# Patient Record
Sex: MALE | Race: WHITE | NOT HISPANIC OR LATINO | Employment: FULL TIME | URBAN - METROPOLITAN AREA
[De-identification: names, ages, dates, MRNs, and addresses within clinical notes are randomized per-mention and may not be internally consistent; named-entity substitution may affect disease eponyms.]

---

## 2018-04-08 ENCOUNTER — OFFICE VISIT (OUTPATIENT)
Dept: URGENT CARE | Facility: PHYSICIAN GROUP | Age: 43
End: 2018-04-08
Payer: COMMERCIAL

## 2018-04-08 VITALS
DIASTOLIC BLOOD PRESSURE: 62 MMHG | RESPIRATION RATE: 18 BRPM | OXYGEN SATURATION: 96 % | TEMPERATURE: 98.3 F | HEART RATE: 84 BPM | SYSTOLIC BLOOD PRESSURE: 108 MMHG | WEIGHT: 280 LBS | BODY MASS INDEX: 36.94 KG/M2

## 2018-04-08 DIAGNOSIS — J01.40 ACUTE PANSINUSITIS, RECURRENCE NOT SPECIFIED: ICD-10-CM

## 2018-04-08 DIAGNOSIS — R05.9 COUGH: ICD-10-CM

## 2018-04-08 PROCEDURE — 99203 OFFICE O/P NEW LOW 30 MIN: CPT | Performed by: NURSE PRACTITIONER

## 2018-04-08 RX ORDER — DOXYCYCLINE HYCLATE 100 MG
100 TABLET ORAL 2 TIMES DAILY
Qty: 20 TAB | Refills: 0 | Status: SHIPPED | OUTPATIENT
Start: 2018-04-08

## 2018-04-08 ASSESSMENT — ENCOUNTER SYMPTOMS
FEVER: 1
SINUS PAIN: 1
CARDIOVASCULAR NEGATIVE: 1
MUSCULOSKELETAL NEGATIVE: 1
ABDOMINAL PAIN: 0
SPUTUM PRODUCTION: 0
VOMITING: 0
SHORTNESS OF BREATH: 0
DIZZINESS: 0
NAUSEA: 1
WHEEZING: 0
EYES NEGATIVE: 1
HEADACHES: 1
COUGH: 1

## 2018-04-08 NOTE — PATIENT INSTRUCTIONS
Cough, Adult  Introduction  A cough helps to clear your throat and lungs. A cough may last only 2-3 weeks (acute), or it may last longer than 8 weeks (chronic). Many different things can cause a cough. A cough may be a sign of an illness or another medical condition.  Follow these instructions at home:  · Pay attention to any changes in your cough.  · Take medicines only as told by your doctor.  ¨ If you were prescribed an antibiotic medicine, take it as told by your doctor. Do not stop taking it even if you start to feel better.  ¨ Talk with your doctor before you try using a cough medicine.  · Drink enough fluid to keep your pee (urine) clear or pale yellow.  · If the air is dry, use a cold steam vaporizer or humidifier in your home.  · Stay away from things that make you cough at work or at home.  · If your cough is worse at night, try using extra pillows to raise your head up higher while you sleep.  · Do not smoke, and try not to be around smoke. If you need help quitting, ask your doctor.  · Do not have caffeine.  · Do not drink alcohol.  · Rest as needed.  Contact a doctor if:  · You have new problems (symptoms).  · You cough up yellow fluid (pus).  · Your cough does not get better after 2-3 weeks, or your cough gets worse.  · Medicine does not help your cough and you are not sleeping well.  · You have pain that gets worse or pain that is not helped with medicine.  · You have a fever.  · You are losing weight and you do not know why.  · You have night sweats.  Get help right away if:  · You cough up blood.  · You have trouble breathing.  · Your heartbeat is very fast.  This information is not intended to replace advice given to you by your health care provider. Make sure you discuss any questions you have with your health care provider.  Document Released: 08/30/2012 Document Revised: 05/25/2017 Document Reviewed: 02/24/2016  © 2017 [employer]  Sinusitis, Adult  Sinusitis is soreness and inflammation of your  sinuses. Sinuses are hollow spaces in the bones around your face. They are located:  · Around your eyes.  · In the middle of your forehead.  · Behind your nose.  · In your cheekbones.  Your sinuses and nasal passages are lined with a stringy fluid (mucus). Mucus normally drains out of your sinuses. When your nasal tissues get inflamed or swollen, the mucus can get trapped or blocked so air cannot flow through your sinuses. This lets bacteria, viruses, and funguses grow, and that leads to infection.  Follow these instructions at home:  Medicines  · Take, use, or apply over-the-counter and prescription medicines only as told by your doctor. These may include nasal sprays.  · If you were prescribed an antibiotic medicine, take it as told by your doctor. Do not stop taking the antibiotic even if you start to feel better.  Hydrate and Humidify  · Drink enough water to keep your pee (urine) clear or pale yellow.  · Use a cool mist humidifier to keep the humidity level in your home above 50%.  · Breathe in steam for 10-15 minutes, 3-4 times a day or as told by your doctor. You can do this in the bathroom while a hot shower is running.  · Try not to spend time in cool or dry air.  Rest  · Rest as much as possible.  · Sleep with your head raised (elevated).  · Make sure to get enough sleep each night.  General instructions  · Put a warm, moist washcloth on your face 3-4 times a day or as told by your doctor. This will help with discomfort.  · Wash your hands often with soap and water. If there is no soap and water, use hand .  · Do not smoke. Avoid being around people who are smoking (secondhand smoke).  · Keep all follow-up visits as told by your doctor. This is important.  Contact a doctor if:  · You have a fever.  · Your symptoms get worse.  · Your symptoms do not get better within 10 days.  Get help right away if:  · You have a very bad headache.  · You cannot stop throwing up (vomiting).  · You have pain or  swelling around your face or eyes.  · You have trouble seeing.  · You feel confused.  · Your neck is stiff.  · You have trouble breathing.  This information is not intended to replace advice given to you by your health care provider. Make sure you discuss any questions you have with your health care provider.  Document Released: 06/05/2009 Document Revised: 08/13/2017 Document Reviewed: 10/12/2016  [employer] Interactive Patient Education © 2017 [employer]

## 2018-04-08 NOTE — PROGRESS NOTES
Subjective:      Sina Huynh is a 42 y.o. male who presents with Cough (cough, vomiting, chills, headaches, sinus pressure x4 days)            HPI   Pt c/o cough, and sinus pressure that started over weekend  Sunday tickle in throat   Monday developed a cough deep and sinus congestion  + emesis x 2 days with coughing--> resolved  Feels nauseated, but emesis has resolved. Patient has just been drinking diluted Gatorade.  No abdominal pain  Decrease  po intake but tolerating fluids  + ha  + sinus congestion--> green and yellow discharge   + low grade temps  + sick contacts  Has been staying home last few days     Med: advil and sinus rinse mild help used afrin a few times with relief     PMH:  has a past medical history of GERD (gastroesophageal reflux disease) and Hypertension.  MEDS:   Current Outpatient Prescriptions:   •  doxycycline (VIBRAMYCIN) 100 MG Tab, Take 1 Tab by mouth 2 times a day., Disp: 20 Tab, Rfl: 0  •  amlodipine (NORVASC) 10 MG TABS, Take 1 Tab by mouth every day., Disp: 30 Tab, Rfl: 11  •  potassium chloride SA (K-DUR) 10 MEQ TBCR, Take 10 mEq by mouth 2 times a day., Disp: , Rfl:   •  lisinopril (PRINIVIL, ZESTRIL) 40 MG tablet, Take 40 mg by mouth every day., Disp: , Rfl:   •  Esomeprazole Magnesium (NEXIUM PO), Take 1 Tab by mouth., Disp: , Rfl:   •  hydrochlorothiazide (HYDRODIURIL) 12.5 MG tablet, Take 12.5 mg by mouth every day., Disp: , Rfl:   ALLERGIES: No Known Allergies  SURGHX:   Past Surgical History:   Procedure Laterality Date   • PB REMOVAL OF TONSILS,<13 Y/O  infant     SOCHX:  reports that he has been smoking Cigarettes and Cigars.  His smokeless tobacco use includes Chew. He reports that he drinks alcohol. He reports that he does not use drugs.  FH: family history is not on file.    Review of Systems   Constitutional: Positive for fever.   HENT: Positive for congestion and sinus pain.         Ear fullness no pain   Eyes: Negative.    Respiratory: Positive for cough.  Negative for sputum production, shortness of breath and wheezing.    Cardiovascular: Negative.    Gastrointestinal: Positive for nausea. Negative for abdominal pain and vomiting.   Musculoskeletal: Negative.    Skin: Negative.    Neurological: Positive for headaches. Negative for dizziness.          Objective:     /62   Pulse 84   Temp 36.8 °C (98.3 °F)   Resp 18   Wt (!) 127 kg (280 lb)   SpO2 96%   BMI 36.94 kg/m²      Physical Exam   Constitutional: He is oriented to person, place, and time. He appears well-developed and well-nourished.   HENT:   Head: Normocephalic and atraumatic.   Right Ear: Hearing, external ear and ear canal normal. A middle ear effusion is present.   Left Ear: Hearing, external ear and ear canal normal. A middle ear effusion is present.   Nose: Mucosal edema present. Right sinus exhibits maxillary sinus tenderness and frontal sinus tenderness. Left sinus exhibits maxillary sinus tenderness and frontal sinus tenderness.   Eyes: Conjunctivae are normal.   Neck: Normal range of motion. Neck supple.   Cardiovascular: Normal rate, regular rhythm and normal heart sounds.    Abdominal: Soft.   Musculoskeletal: Normal range of motion.   Lymphadenopathy:     He has no cervical adenopathy.   Neurological: He is alert and oriented to person, place, and time.   Skin: Skin is warm and dry. Capillary refill takes less than 2 seconds.   Psychiatric: He has a normal mood and affect. His behavior is normal. Judgment and thought content normal.               Assessment/Plan:       1. Acute pansinusitis, recurrence not specified  doxycycline (VIBRAMYCIN) 100 MG Tab   2. Cough       Handout given to patient regarding sinusitis and cough  Educated on the importance of staying hydrated  Prescription for doxycycline with medication   Medication education done  Can alternate Tylenol and Motrin when necessary fevers or pain  E small meals drink small quantities of fluid at a time to prevent nausea and  possible emesis from coughing    Sinusitis:  Start antibiotics  Continue saline rinse  Add steroid nasal spray such as Flonase      Cough:  Discussed importance of staying hydrated  Over-the-counter cough medicines such as Robitussin cough and congestion  Can also try honey    Follow-up for any worsening symptoms.

## 2020-11-22 ENCOUNTER — TELEMEDICINE (OUTPATIENT)
Dept: TELEHEALTH | Facility: TELEMEDICINE | Age: 45
End: 2020-11-22
Payer: COMMERCIAL

## 2020-11-22 VITALS
WEIGHT: 295 LBS | HEIGHT: 73 IN | SYSTOLIC BLOOD PRESSURE: 117 MMHG | DIASTOLIC BLOOD PRESSURE: 77 MMHG | HEART RATE: 82 BPM | OXYGEN SATURATION: 90 % | BODY MASS INDEX: 39.1 KG/M2

## 2020-11-22 DIAGNOSIS — R50.9 SUBJECTIVE FEVER: ICD-10-CM

## 2020-11-22 DIAGNOSIS — R53.83 MALAISE AND FATIGUE: ICD-10-CM

## 2020-11-22 DIAGNOSIS — R53.81 MALAISE AND FATIGUE: ICD-10-CM

## 2020-11-22 PROCEDURE — 99214 OFFICE O/P EST MOD 30 MIN: CPT | Mod: 95,CR | Performed by: PHYSICIAN ASSISTANT

## 2020-11-22 NOTE — PROGRESS NOTES
"Virtual Visit: Established Patient   This visit was conducted via Zoom using secure and encrypted videoconferencing technology. The patient was in a private location in the state of [place]    The patient's identity was confirmed and verbal consent was obtained for this virtual visit.    Subjective:   CC: No chief complaint on file.      Sina Huynh is a 45 y.o. male presenting for evaluation and management of:    This is a new problem. Pt states that he developed a \"tickle in the back of his throat\" on Friday the 13th. Subsequently developed cough and runny nose. States sx mostly resolved and improved, aside from some lingering congestion. Saw PCP on Tuesday and was restarted on BP medications, but no concerns voiced about cold symptoms.  Developed fatigue and low grade fevers again this past Friday/ Saturday. Also had migraine. Migraine consistent with prior migraines. This morning he felt \"foggy\" and strange. No cough or SOB. No CP.  Pt's wife took sp02 this morning with home sensor and it was 87. Tested sensor on wife and kids who registered in the high 90s.  BP at home was 117/77. Took tylenol for migraine earlier- helped a bit.         ROS   Denies nausea or vomiting. No chest pains or shortness of breath.     No Known Allergies    Current medicines (including changes today)  Current Outpatient Medications   Medication Sig Dispense Refill   • doxycycline (VIBRAMYCIN) 100 MG Tab Take 1 Tab by mouth 2 times a day. 20 Tab 0   • amlodipine (NORVASC) 10 MG TABS Take 1 Tab by mouth every day. 30 Tab 11   • potassium chloride SA (K-DUR) 10 MEQ TBCR Take 10 mEq by mouth 2 times a day.     • lisinopril (PRINIVIL, ZESTRIL) 40 MG tablet Take 40 mg by mouth every day.     • Esomeprazole Magnesium (NEXIUM PO) Take 1 Tab by mouth.     • hydrochlorothiazide (HYDRODIURIL) 12.5 MG tablet Take 12.5 mg by mouth every day.       No current facility-administered medications for this visit.        Patient Active Problem List " "   Diagnosis Date Noted   • Chest pain 05/07/2014       History reviewed. No pertinent family history.    He  has a past medical history of GERD (gastroesophageal reflux disease) and Hypertension.  He  has a past surgical history that includes pr removal of tonsils,<12 y/o (infant).       Objective:   /77 Comment: this morning home BP cuff  Pulse 82   Ht 1.854 m (6' 1\")   Wt (!) 133.8 kg (295 lb)   SpO2 90% Comment: pt has home sensor. Sensor visible- sats between 89-91%  BMI 38.92 kg/m²     Physical Exam:  Constitutional: Alert, no distress, well-groomed.  Skin: No rashes in visible areas.  Eye: Round. Conjunctiva clear, lids normal. No icterus.   ENMT: Lips pink without lesions, good dentition, moist mucous membranes. Phonation normal.  Neck: No masses, no thyromegaly. Moves freely without pain.  Respiratory: Unlabored respiratory effort, no cough or audible wheeze. Speaks in full sentences. Observed SpO2 measurement with home sensor- 91% drops to 89-90% when pt starts speaking.  Psych: Alert and oriented x3, normal affect and mood.       Assessment and Plan:   The following treatment plan was discussed:     1. Subjective fever    2. Malaise and fatigue    Pt's spO2 concerning. Considerations include but not limited to Covid pneumonia and CAP.  I would like pt to be evaluated at a higher level of care today. He will go to [facility] ED.    Follow-up: Follow up for failure of sx to resolve or with any questions or concerns.         "

## 2021-02-03 ENCOUNTER — OFFICE VISIT (OUTPATIENT)
Dept: URGENT CARE | Facility: PHYSICIAN GROUP | Age: 46
End: 2021-02-03
Payer: COMMERCIAL

## 2021-02-03 ENCOUNTER — HOSPITAL ENCOUNTER (OUTPATIENT)
Facility: MEDICAL CENTER | Age: 46
End: 2021-02-03
Attending: NURSE PRACTITIONER
Payer: COMMERCIAL

## 2021-02-03 VITALS
DIASTOLIC BLOOD PRESSURE: 90 MMHG | HEIGHT: 73 IN | WEIGHT: 280 LBS | HEART RATE: 71 BPM | RESPIRATION RATE: 18 BRPM | SYSTOLIC BLOOD PRESSURE: 140 MMHG | OXYGEN SATURATION: 96 % | BODY MASS INDEX: 37.11 KG/M2 | TEMPERATURE: 96.8 F

## 2021-02-03 DIAGNOSIS — Z20.818 EXPOSURE TO STREP THROAT: ICD-10-CM

## 2021-02-03 DIAGNOSIS — J02.9 SORE THROAT: ICD-10-CM

## 2021-02-03 DIAGNOSIS — J02.9 ACUTE PHARYNGITIS, UNSPECIFIED ETIOLOGY: Primary | ICD-10-CM

## 2021-02-03 LAB
INT CON NEG: NORMAL
INT CON POS: NORMAL
S PYO AG THROAT QL: NEGATIVE

## 2021-02-03 PROCEDURE — U0003 INFECTIOUS AGENT DETECTION BY NUCLEIC ACID (DNA OR RNA); SEVERE ACUTE RESPIRATORY SYNDROME CORONAVIRUS 2 (SARS-COV-2) (CORONAVIRUS DISEASE [COVID-19]), AMPLIFIED PROBE TECHNIQUE, MAKING USE OF HIGH THROUGHPUT TECHNOLOGIES AS DESCRIBED BY CMS-2020-01-R: HCPCS

## 2021-02-03 PROCEDURE — U0005 INFEC AGEN DETEC AMPLI PROBE: HCPCS

## 2021-02-03 PROCEDURE — 87880 STREP A ASSAY W/OPTIC: CPT | Performed by: NURSE PRACTITIONER

## 2021-02-03 PROCEDURE — 99214 OFFICE O/P EST MOD 30 MIN: CPT | Performed by: NURSE PRACTITIONER

## 2021-02-03 RX ORDER — AMOXICILLIN 500 MG/1
500 CAPSULE ORAL 2 TIMES DAILY
Qty: 20 CAP | Refills: 0 | Status: SHIPPED | OUTPATIENT
Start: 2021-02-03 | End: 2021-02-13

## 2021-02-03 ASSESSMENT — ENCOUNTER SYMPTOMS
SORE THROAT: 1
COUGH: 0
NAUSEA: 0
FEVER: 0
SHORTNESS OF BREATH: 0
CHILLS: 0
SINUS PAIN: 0
HEADACHES: 0

## 2021-02-03 ASSESSMENT — LIFESTYLE VARIABLES: SUBSTANCE_ABUSE: 0

## 2021-02-04 LAB
COVID ORDER STATUS COVID19: NORMAL
SARS-COV-2 RNA RESP QL NAA+PROBE: NOTDETECTED
SPECIMEN SOURCE: NORMAL

## 2021-02-04 NOTE — PROGRESS NOTES
Sina Huynh is a 45 y.o. male who presents for Pharyngitis (onset last night)      HPI this new problem.  Sina is a 45-year-old male patient presents with sore throat that started last night.  His throat feels scratchy, rotten irritated.  He was exposed to his son who tested positive for strep throat infection.  He has been isolating away from his son for the past 2 days.  He is also concerned about Covid.  He had Covid in November 2020 and feels somewhat similar in his throat.  Treatments tried none.  He is requesting a Covid test prior to going back to work due to this illness.  Treatments tried: Increase fluids.  No other aggravating or alleviating factors.    Review of Systems   Constitutional: Negative for chills, fever and malaise/fatigue.   HENT: Positive for sore throat. Negative for congestion and sinus pain.    Respiratory: Negative for cough and shortness of breath.    Gastrointestinal: Negative for nausea.   Neurological: Negative for headaches.   Endo/Heme/Allergies: Negative for environmental allergies.   Psychiatric/Behavioral: Negative for substance abuse.       No Known Allergies  Past Medical History:   Diagnosis Date   • GERD (gastroesophageal reflux disease)    • Hypertension      Past Surgical History:   Procedure Laterality Date   • PB REMOVAL OF TONSILS,<11 Y/O  infant     No family history on file.  Social History     Tobacco Use   • Smoking status: Former Smoker     Types: Cigarettes, Cigars   • Smokeless tobacco: Current User     Types: Chew   • Tobacco comment: quit smoking, still chewing   Substance Use Topics   • Alcohol use: Yes     Comment: 2 beers daily     Patient Active Problem List   Diagnosis   • Chest pain     Current Outpatient Medications on File Prior to Visit   Medication Sig Dispense Refill   • doxycycline (VIBRAMYCIN) 100 MG Tab Take 1 Tab by mouth 2 times a day. (Patient not taking: Reported on 2/3/2021) 20 Tab 0   • amlodipine (NORVASC) 10 MG TABS Take 1 Tab  "by mouth every day. (Patient not taking: Reported on 2/3/2021) 30 Tab 11   • potassium chloride SA (K-DUR) 10 MEQ TBCR Take 10 mEq by mouth 2 times a day.     • lisinopril (PRINIVIL, ZESTRIL) 40 MG tablet Take 40 mg by mouth every day.     • Esomeprazole Magnesium (NEXIUM PO) Take 1 Tab by mouth.     • hydrochlorothiazide (HYDRODIURIL) 12.5 MG tablet Take 12.5 mg by mouth every day.       No current facility-administered medications on file prior to visit.           Objective:     /90   Pulse 71   Temp 36 °C (96.8 °F) (Temporal)   Resp 18   Ht 1.854 m (6' 1\")   Wt (!) 127 kg (280 lb)   SpO2 96%   BMI 36.94 kg/m²     Physical Exam  Vitals signs and nursing note reviewed.   Constitutional:       General: He is not in acute distress.     Appearance: Normal appearance. He is well-developed and normal weight.   HENT:      Head: Normocephalic.      Mouth/Throat:      Mouth: Mucous membranes are moist.      Pharynx: Posterior oropharyngeal erythema present. No oropharyngeal exudate.   Eyes:      Pupils: Pupils are equal, round, and reactive to light.   Neck:      Musculoskeletal: Normal range of motion and neck supple.   Cardiovascular:      Rate and Rhythm: Normal rate and regular rhythm.      Pulses: Normal pulses.      Heart sounds: Normal heart sounds.   Pulmonary:      Effort: Pulmonary effort is normal.      Breath sounds: Normal breath sounds.   Lymphadenopathy:      Head:      Right side of head: No tonsillar adenopathy.      Left side of head: No tonsillar adenopathy.      Cervical: No cervical adenopathy.      Upper Body:      Right upper body: No supraclavicular adenopathy.      Left upper body: No supraclavicular adenopathy.   Skin:     General: Skin is warm and dry.      Capillary Refill: Capillary refill takes less than 2 seconds.   Neurological:      Mental Status: He is alert and oriented to person, place, and time.   Psychiatric:         Mood and Affect: Mood normal.         Speech: Speech " normal.         Behavior: Behavior normal. Behavior is cooperative.         Thought Content: Thought content normal.       Rapid Strep A : negative    Assessment /Associated Orders:      1. Acute pharyngitis, unspecified etiology     2. Exposure to strep throat  POCT Rapid Strep A    SARS-CoV-2 PCR (24 hour In-House): Collect NP swab in VTM    amoxicillin (AMOXIL) 500 MG Cap   3. Sore throat  SARS-CoV-2 PCR (24 hour In-House): Collect NP swab in VTM    amoxicillin (AMOXIL) 500 MG Cap       Medical Decision Making:      VSS at today's acute urgent care visit.   Contingent antibiotic prescription given to patient to fill upon meeting criteria of guidelines discussed.     Strep test was negative in clinic today.  He has not had any fevers or systemic illness.  Covid test-pending  Advised to quarantine at home per the CDC guidelines  Salt water gargles BID and prn. Suggested 1/4 to 1/2 teaspoon (1.5 to 3.0 g) of salt per one cup (8 ounces or 250 mL) of warm water    Discussed management options (risks,benefits, and alternatives to treatment). Pt expresses understanding and the treatment plan was agreed upon. Questions were encouraged and answered to pt's satisfaction.   Advised the patient to follow-up with the primary care physician for recheck, reevaluation, and consideration of further management if necessary.   Return to urgent care prn if new or worsening sx or if there is no improvement in condition prn. Red flags discussed and indications to immediately call 911 or present to the Emergency Department.     I personally reviewed prior external notes and test results pertinent to today's visit.  I have independently reviewed and interpreted all diagnostics ordered during this urgent care acute visit.   Time spent evaluating this patient was at least 30 minutes and includes preparing for visit, counseling/education, exam and evaluation, obtaining history, independent interpretation and ordering  lab/test/procedures/medication management. This does not include time spent on separately billable procedures/tests.      Please note that this dictation was created using voice recognition software. I have made a reasonable attempt to correct obvious errors, but I expect that there are errors of grammar and possibly content that I did not discover before finalizing the note.    This note was electronically signed by Harriet HAMILTON, Urgent Care

## 2025-02-23 ENCOUNTER — APPOINTMENT (OUTPATIENT)
Dept: URGENT CARE | Facility: PHYSICIAN GROUP | Age: 50
End: 2025-02-23
Payer: COMMERCIAL